# Patient Record
Sex: FEMALE | Race: WHITE
[De-identification: names, ages, dates, MRNs, and addresses within clinical notes are randomized per-mention and may not be internally consistent; named-entity substitution may affect disease eponyms.]

---

## 2022-07-06 ENCOUNTER — APPOINTMENT (OUTPATIENT)
Dept: ORTHOPEDIC SURGERY | Facility: CLINIC | Age: 42
End: 2022-07-06

## 2022-07-06 VITALS — HEIGHT: 67 IN | WEIGHT: 144 LBS | BODY MASS INDEX: 22.6 KG/M2

## 2022-07-06 PROBLEM — Z00.00 ENCOUNTER FOR PREVENTIVE HEALTH EXAMINATION: Status: ACTIVE | Noted: 2022-07-06

## 2022-07-06 PROCEDURE — 99203 OFFICE O/P NEW LOW 30 MIN: CPT

## 2022-07-08 ENCOUNTER — TRANSCRIPTION ENCOUNTER (OUTPATIENT)
Age: 42
End: 2022-07-08

## 2022-07-11 ENCOUNTER — OUTPATIENT (OUTPATIENT)
Dept: OUTPATIENT SERVICES | Facility: HOSPITAL | Age: 42
LOS: 1 days | End: 2022-07-11

## 2022-07-11 ENCOUNTER — APPOINTMENT (OUTPATIENT)
Dept: MRI IMAGING | Facility: CLINIC | Age: 42
End: 2022-07-11

## 2022-07-11 ENCOUNTER — RESULT REVIEW (OUTPATIENT)
Age: 42
End: 2022-07-11

## 2022-07-11 PROCEDURE — 72148 MRI LUMBAR SPINE W/O DYE: CPT | Mod: 26

## 2022-07-13 ENCOUNTER — APPOINTMENT (OUTPATIENT)
Dept: ORTHOPEDIC SURGERY | Facility: CLINIC | Age: 42
End: 2022-07-13

## 2022-07-13 VITALS — HEIGHT: 67 IN | BODY MASS INDEX: 22.6 KG/M2 | WEIGHT: 144 LBS

## 2022-07-13 PROCEDURE — 99214 OFFICE O/P EST MOD 30 MIN: CPT

## 2022-07-13 RX ORDER — PREDNISONE 20 MG/1
20 TABLET ORAL DAILY
Qty: 18 | Refills: 0 | Status: ACTIVE | COMMUNITY
Start: 2022-07-13 | End: 1900-01-01

## 2022-07-29 ENCOUNTER — APPOINTMENT (OUTPATIENT)
Dept: ORTHOPEDIC SURGERY | Facility: CLINIC | Age: 42
End: 2022-07-29

## 2022-07-29 VITALS — TEMPERATURE: 97.3 F

## 2022-07-29 VITALS
BODY MASS INDEX: 21.19 KG/M2 | DIASTOLIC BLOOD PRESSURE: 72 MMHG | WEIGHT: 135 LBS | HEIGHT: 67 IN | SYSTOLIC BLOOD PRESSURE: 112 MMHG | HEART RATE: 79 BPM

## 2022-07-29 DIAGNOSIS — M54.16 RADICULOPATHY, LUMBAR REGION: ICD-10-CM

## 2022-07-29 DIAGNOSIS — M51.26 OTHER INTERVERTEBRAL DISC DISPLACEMENT, LUMBAR REGION: ICD-10-CM

## 2022-07-29 DIAGNOSIS — M48.07 SPINAL STENOSIS, LUMBOSACRAL REGION: ICD-10-CM

## 2022-07-29 PROCEDURE — 99215 OFFICE O/P EST HI 40 MIN: CPT

## 2022-07-29 RX ORDER — CYCLOBENZAPRINE HYDROCHLORIDE 10 MG/1
10 TABLET, FILM COATED ORAL
Qty: 21 | Refills: 0 | Status: ACTIVE | COMMUNITY
Start: 2022-07-29 | End: 1900-01-01

## 2022-07-29 NOTE — DISCUSSION/SUMMARY
[de-identified] : We again reviewed further treatment options both nonsurgical and surgical.  She has done some physical therapy with only limited relief.  The prednisone did make her heel better albeit temporarily.  She still feels her quality of life is affected.  We discussed the role of surgery.  Risks of surgery were discussed including but not limited to bleeding, infection, recurrent disc herniation, spinal instability, damage to nerves and vessels, continued or worsening pain and weakness, dural injury, CSF leak, need for further procedures, PE/DVT, GI, , pulmonary, and cardiac complications,  anesthetic risks, and death. The patient understands the risks and benefits and alternatives.  At this point she is unsure if she wished to proceed with surgery.  She will consider her options and let me know how she would like to proceed.  Follow-up in 2 to 3 weeks time or sooner with any changes or worsening of her symptoms.

## 2022-07-29 NOTE — HISTORY OF PRESENT ILLNESS
[de-identified] : Ms. ZACARIAS GONZALEZ  is a 42 year old female who presents to the office for a follow-up visit.  She has persistent back and bilateral leg pain.  Her left foot feels numb.  She felt better when she was taking the prednisone.  Once she stopped her symptoms came back.  \par

## 2022-07-29 NOTE — PHYSICAL EXAM
[Antalgic] : antalgic [Stooped] : stooped [de-identified] : Examination of the lumbar spine reveals no midline tenderness palpation, step-offs, or skin lesions. Decreased range of motion with respect to flexion, extension, lateral bending, and rotation. No tenderness to palpation of the sciatic notch. No tenderness palpation of the bilateral greater trochanters. No pain with passive internal/external rotation of the hips. No instability of bilateral lower extremities.  Negative KAMALJIT. Negative straight leg raise bilaterally. No bowstring. Negative femoral stretch.  4 out of 5 iliopsoas, hip abductors, hips adductors, quadriceps, hamstrings, gastrocsoleus, tibialis anterior, extensor hallucis longus, peroneals. Grossly intact sensation to light touch bilateral lower extremities. 1+ patellar and Achilles reflexes. Downgoing Babinski. No clonus. Intact proprioception. Palpable pulses. No skin lesion and no edema on the right and left lower extremities. [de-identified] : I personally reviewed her MRI and agree with the radiology interpretation.\par  MR Lumbar Spine No Cont             Final\par \par No Documents Attached\par \par \par \par \par   EXAM: [id number] - MR SPINE LUMBAR  - ORDERED BY: LETICIA SHULTZ\par \par \par PROCEDURE DATE:  07/11/2022\par \par \par \par INTERPRETATION:  PROCEDURE: MRI of the lumbosacral spine without contrast\par \par INDICATION: Low back pain; lumbar radiculopathy\par \par TECHNIQUE: Sagittal T1, T2, and STIR and axial T1 and T2 weighted images of the lumbosacral spine were obtained.\par \par COMPARISON: None\par \par FINDINGS: The MRI examination demonstrates mild levoscoliosis with kyphotic deformity centered at the L2/3 level. There is mild to moderate loss of intervertebral disc space height at L2/3 and L4/5. There is disc desiccation at L2/3 and L4/5. There is anterior osteophytic lipping at L2/3. The vertebral body heights and rest of the intervertebral disc spaces are maintained. There are degenerative endplate changes at L2/3.. The rest of the vertebral body marrow signal is appropriate for the patient's stated age. The conus medullaris ends at L1/2\par \par At the L1/2 level, there is no disc herniation. There is no spinal canal stenosis or neural foramen narrowing.\par \par At the L2/3 level, there is disc bulge with a annular fissure with small central disc herniation. There is no spinal canal stenosis or neural foramen narrowing.\par \par At the L3/4 level, there is disc bulge with a annular fissure with superimposed small right paracentral disc herniation which abuts the right L4 nerve root and correlation with the patient's symptoms is recommended. There is no spinal canal stenosis or neural foramen narrowing.\par \par At the L4/5 level, there is a central to right-sided disc herniation with large extruded disc fragment severely compressing the thecal sac and the right L5 nerve root and correlation with the patient's clinical symptoms is recommended. There are mild degenerative changes involving the facets and laterally. This combination results in severe central spinal canal stenosis. There is mild bilateral neural foramen narrowing.\par \par At the L5/S1 level, there is no disc herniation. There is no spinal canal stenosis or neural foramen narrowing.\par \par IMPRESSION: Mild lumbar levoscoliosis with kyphotic deformity centered at the L2/3 level. Multilevel degenerative disc disease with central to right-sided disc herniation with large extruded disc severely compressing the thecal sac and right L5 nerve root and correlation with patient's clinical symptoms is recommended.\par \par --- End of Report ---\par \par \par \par \par \par \par ROBER BO MD; Attending Radiologist\par This document has been electronically signed. Jul 12 2022 12:37PM\par \par  \par \par  Ordered by: LETICIA SHULTZ       Collected/Examined: [id number] 08:01PM       \par Verification Required       Stage: Final       \par  Performed at: [facility]       Resulted: [id number] 12:24PM       Last Updated: 12Jul2022 12:41PM       Accession: [id number]

## 2022-09-16 ENCOUNTER — APPOINTMENT (OUTPATIENT)
Dept: ORTHOPEDIC SURGERY | Facility: CLINIC | Age: 42
End: 2022-09-16

## 2023-07-06 ENCOUNTER — OFFICE (OUTPATIENT)
Dept: URBAN - METROPOLITAN AREA CLINIC 90 | Facility: CLINIC | Age: 43
Setting detail: OPHTHALMOLOGY
End: 2023-07-06
Payer: COMMERCIAL

## 2023-07-06 DIAGNOSIS — H01.001: ICD-10-CM

## 2023-07-06 DIAGNOSIS — H01.004: ICD-10-CM

## 2023-07-06 DIAGNOSIS — H35.40: ICD-10-CM

## 2023-07-06 DIAGNOSIS — H20.00: ICD-10-CM

## 2023-07-06 PROCEDURE — 92014 COMPRE OPH EXAM EST PT 1/>: CPT | Performed by: OPHTHALMOLOGY

## 2023-07-06 ASSESSMENT — REFRACTION_AUTOREFRACTION
OS_AXIS: 001
OD_SPHERE: -2.50
OS_SPHERE: -1.00
OD_AXIS: 163
OD_CYLINDER: -0.50
OS_CYLINDER: -0.50

## 2023-07-06 ASSESSMENT — SPHEQUIV_DERIVED
OD_SPHEQUIV: -2.75
OS_SPHEQUIV: -1.25

## 2023-07-06 ASSESSMENT — AXIALLENGTH_DERIVED
OS_AL: 24.3309
OD_AL: 25.0721

## 2023-07-06 ASSESSMENT — REFRACTION_CURRENTRX
OD_AXIS: 094
OS_OVR_VA: 20/
OS_OVR_VA: 20/
OD_CYLINDER: +0.50
OS_SPHERE: -1.00
OS_AXIS: 172
OS_AXIS: 087
OD_CYLINDER: -0.50
OS_VPRISM_DIRECTION: SV
OD_OVR_VA: 20/
OS_SPHERE: -1.50
OD_SPHERE: -2.25
OD_VPRISM_DIRECTION: SV
OD_OVR_VA: 20/
OD_AXIS: 161
OS_VPRISM_DIRECTION: SV
OD_VPRISM_DIRECTION: SV
OS_CYLINDER: +0.50
OD_SPHERE: -1.75
OS_CYLINDER: -0.50

## 2023-07-06 ASSESSMENT — KERATOMETRY
METHOD_AUTO_MANUAL: AUTO
OD_K2POWER_DIOPTERS: 43.25
OS_AXISANGLE_DEGREES: 094
OS_K1POWER_DIOPTERS: 42.25
OS_K2POWER_DIOPTERS: 43.50
OD_AXISANGLE_DEGREES: 082
OD_K1POWER_DIOPTERS: 42.00

## 2023-07-06 ASSESSMENT — CONFRONTATIONAL VISUAL FIELD TEST (CVF)
OS_FINDINGS: FULL
OD_FINDINGS: FULL

## 2023-07-06 ASSESSMENT — TONOMETRY
OS_IOP_MMHG: 17
OD_IOP_MMHG: 17

## 2023-07-06 ASSESSMENT — VISUAL ACUITY
OD_BCVA: 20/20
OS_BCVA: 20/25-1

## 2023-07-06 ASSESSMENT — LID EXAM ASSESSMENTS
OD_BLEPHARITIS: RUL 2+
OS_BLEPHARITIS: LUL 2+